# Patient Record
Sex: MALE | Race: WHITE
[De-identification: names, ages, dates, MRNs, and addresses within clinical notes are randomized per-mention and may not be internally consistent; named-entity substitution may affect disease eponyms.]

---

## 2019-10-15 NOTE — RAD
EXAM: 3 views of the right second toe



HISTORY: Right second toe pain for 8 weeks



COMPARISON: None



FINDINGS: There is no evidence of acute fracture or dislocation. No soft tissue swelling is seen. No 
degenerative changes are present. No radiopaque foreign body is seen.



IMPRESSION: No evidence of acute osseous abnormality.



Reported By: Joshua Nicolas 

Electronically Signed:  10/15/2019 9:55 AM

## 2021-07-17 ENCOUNTER — HOSPITAL ENCOUNTER (INPATIENT)
Dept: HOSPITAL 92 - ERS | Age: 46
LOS: 3 days | Discharge: HOME | DRG: 177 | End: 2021-07-20
Attending: INTERNAL MEDICINE | Admitting: INTERNAL MEDICINE
Payer: COMMERCIAL

## 2021-07-17 VITALS — BODY MASS INDEX: 25.9 KG/M2

## 2021-07-17 DIAGNOSIS — I10: ICD-10-CM

## 2021-07-17 DIAGNOSIS — J12.82: ICD-10-CM

## 2021-07-17 DIAGNOSIS — Z98.52: ICD-10-CM

## 2021-07-17 DIAGNOSIS — Z79.899: ICD-10-CM

## 2021-07-17 DIAGNOSIS — R19.7: ICD-10-CM

## 2021-07-17 DIAGNOSIS — E87.6: ICD-10-CM

## 2021-07-17 DIAGNOSIS — E86.0: ICD-10-CM

## 2021-07-17 DIAGNOSIS — U07.1: Primary | ICD-10-CM

## 2021-07-17 PROCEDURE — 96374 THER/PROPH/DIAG INJ IV PUSH: CPT

## 2021-07-17 PROCEDURE — 82550 ASSAY OF CK (CPK): CPT

## 2021-07-17 PROCEDURE — 93005 ELECTROCARDIOGRAM TRACING: CPT

## 2021-07-17 PROCEDURE — 83690 ASSAY OF LIPASE: CPT

## 2021-07-17 PROCEDURE — 83880 ASSAY OF NATRIURETIC PEPTIDE: CPT

## 2021-07-17 PROCEDURE — 8E0ZXY6 ISOLATION: ICD-10-PCS | Performed by: EMERGENCY MEDICINE

## 2021-07-17 PROCEDURE — 87449 NOS EACH ORGANISM AG IA: CPT

## 2021-07-17 PROCEDURE — 85025 COMPLETE CBC W/AUTO DIFF WBC: CPT

## 2021-07-17 PROCEDURE — 84132 ASSAY OF SERUM POTASSIUM: CPT

## 2021-07-17 PROCEDURE — 71275 CT ANGIOGRAPHY CHEST: CPT

## 2021-07-17 PROCEDURE — 84484 ASSAY OF TROPONIN QUANT: CPT

## 2021-07-17 PROCEDURE — 86140 C-REACTIVE PROTEIN: CPT

## 2021-07-17 PROCEDURE — 80053 COMPREHEN METABOLIC PANEL: CPT

## 2021-07-17 PROCEDURE — 85379 FIBRIN DEGRADATION QUANT: CPT

## 2021-07-17 PROCEDURE — 87324 CLOSTRIDIUM AG IA: CPT

## 2021-07-17 PROCEDURE — 36415 COLL VENOUS BLD VENIPUNCTURE: CPT

## 2021-07-17 PROCEDURE — 87328 CRYPTOSPORIDIUM AG IA: CPT

## 2021-07-17 PROCEDURE — 71045 X-RAY EXAM CHEST 1 VIEW: CPT

## 2021-07-17 PROCEDURE — 87329 GIARDIA AG IA: CPT

## 2021-07-20 VITALS — SYSTOLIC BLOOD PRESSURE: 139 MMHG | DIASTOLIC BLOOD PRESSURE: 84 MMHG | TEMPERATURE: 97.9 F

## 2022-02-24 ENCOUNTER — HOSPITAL ENCOUNTER (INPATIENT)
Dept: HOSPITAL 92 - ERS | Age: 47
LOS: 2 days | Discharge: HOME | DRG: 247 | End: 2022-02-26
Attending: INTERNAL MEDICINE | Admitting: INTERNAL MEDICINE
Payer: COMMERCIAL

## 2022-02-24 VITALS — BODY MASS INDEX: 28.4 KG/M2

## 2022-02-24 DIAGNOSIS — E78.5: ICD-10-CM

## 2022-02-24 DIAGNOSIS — Z20.822: ICD-10-CM

## 2022-02-24 DIAGNOSIS — Z98.52: ICD-10-CM

## 2022-02-24 DIAGNOSIS — I21.4: Primary | ICD-10-CM

## 2022-02-24 DIAGNOSIS — I77.1: ICD-10-CM

## 2022-02-24 DIAGNOSIS — Z79.899: ICD-10-CM

## 2022-02-24 DIAGNOSIS — I10: ICD-10-CM

## 2022-02-24 DIAGNOSIS — I25.10: ICD-10-CM

## 2022-02-24 DIAGNOSIS — R00.1: ICD-10-CM

## 2022-02-24 DIAGNOSIS — Z98.890: ICD-10-CM

## 2022-02-24 LAB
CK MB SERPL-MCNC: 22.2 NG/ML (ref 0–6.6)
TROPONIN I SERPL DL<=0.01 NG/ML-MCNC: 3.7 NG/ML (ref ?–0.03)

## 2022-02-24 PROCEDURE — 93458 L HRT ARTERY/VENTRICLE ANGIO: CPT

## 2022-02-24 PROCEDURE — 85025 COMPLETE CBC W/AUTO DIFF WBC: CPT

## 2022-02-24 PROCEDURE — 85347 COAGULATION TIME ACTIVATED: CPT

## 2022-02-24 PROCEDURE — 4A023N7 MEASUREMENT OF CARDIAC SAMPLING AND PRESSURE, LEFT HEART, PERCUTANEOUS APPROACH: ICD-10-PCS | Performed by: INTERNAL MEDICINE

## 2022-02-24 PROCEDURE — B2111ZZ FLUOROSCOPY OF MULTIPLE CORONARY ARTERIES USING LOW OSMOLAR CONTRAST: ICD-10-PCS | Performed by: INTERNAL MEDICINE

## 2022-02-24 PROCEDURE — 93798 PHYS/QHP OP CAR RHAB W/ECG: CPT

## 2022-02-24 PROCEDURE — 93010 ELECTROCARDIOGRAM REPORT: CPT

## 2022-02-24 PROCEDURE — 99152 MOD SED SAME PHYS/QHP 5/>YRS: CPT

## 2022-02-24 PROCEDURE — C9600 PERC DRUG-EL COR STENT SING: HCPCS

## 2022-02-24 PROCEDURE — 36415 COLL VENOUS BLD VENIPUNCTURE: CPT

## 2022-02-24 PROCEDURE — C1769 GUIDE WIRE: HCPCS

## 2022-02-24 PROCEDURE — 93005 ELECTROCARDIOGRAM TRACING: CPT

## 2022-02-24 PROCEDURE — 92928 PRQ TCAT PLMT NTRAC ST 1 LES: CPT

## 2022-02-24 PROCEDURE — 82553 CREATINE MB FRACTION: CPT

## 2022-02-24 PROCEDURE — 94760 N-INVAS EAR/PLS OXIMETRY 1: CPT

## 2022-02-24 PROCEDURE — 80061 LIPID PANEL: CPT

## 2022-02-24 PROCEDURE — 80053 COMPREHEN METABOLIC PANEL: CPT

## 2022-02-24 PROCEDURE — 027034Z DILATION OF CORONARY ARTERY, ONE ARTERY WITH DRUG-ELUTING INTRALUMINAL DEVICE, PERCUTANEOUS APPROACH: ICD-10-PCS | Performed by: INTERNAL MEDICINE

## 2022-02-24 PROCEDURE — 93306 TTE W/DOPPLER COMPLETE: CPT

## 2022-02-24 PROCEDURE — 84484 ASSAY OF TROPONIN QUANT: CPT

## 2022-02-24 PROCEDURE — 93926 LOWER EXTREMITY STUDY: CPT

## 2022-02-24 PROCEDURE — C1874 STENT, COATED/COV W/DEL SYS: HCPCS

## 2022-02-24 RX ADMIN — TICAGRELOR SCH MG: 90 TABLET ORAL at 20:00

## 2022-02-25 LAB
ALBUMIN SERPL BCG-MCNC: 3.5 G/DL (ref 3.5–5)
ALP SERPL-CCNC: 69 U/L (ref 40–110)
ALT SERPL W P-5'-P-CCNC: 42 U/L (ref 8–55)
ANION GAP SERPL CALC-SCNC: 10 MMOL/L (ref 10–20)
AST SERPL-CCNC: 99 U/L (ref 5–34)
BASOPHILS # BLD AUTO: 0 THOU/UL (ref 0–0.2)
BASOPHILS NFR BLD AUTO: 0.5 % (ref 0–1)
BILIRUB SERPL-MCNC: 0.7 MG/DL (ref 0.2–1.2)
BUN SERPL-MCNC: 22 MG/DL (ref 8.9–20.6)
CALCIUM SERPL-MCNC: 8.9 MG/DL (ref 7.8–10.44)
CHD RISK SERPL-RTO: 4.2 (ref ?–4.5)
CHLORIDE SERPL-SCNC: 107 MMOL/L (ref 98–107)
CHOLEST SERPL-MCNC: 142 MG/DL
CO2 SERPL-SCNC: 27 MMOL/L (ref 22–29)
CREAT CL PREDICTED SERPL C-G-VRATE: 116 ML/MIN (ref 70–130)
EOSINOPHIL # BLD AUTO: 0.1 THOU/UL (ref 0–0.7)
EOSINOPHIL NFR BLD AUTO: 1.2 % (ref 0–10)
GLOBULIN SER CALC-MCNC: 2.3 G/DL (ref 2.4–3.5)
GLUCOSE SERPL-MCNC: 101 MG/DL (ref 70–105)
HDLC SERPL-MCNC: 34 MG/DL
HGB BLD-MCNC: 12.9 G/DL (ref 14–18)
LDLC SERPL CALC-MCNC: 67 MG/DL
LYMPHOCYTES # BLD: 1.8 THOU/UL (ref 1.2–3.4)
LYMPHOCYTES NFR BLD AUTO: 23.3 % (ref 21–51)
MCH RBC QN AUTO: 30.9 PG (ref 27–31)
MCV RBC AUTO: 93.4 FL (ref 78–98)
MONOCYTES # BLD AUTO: 0.9 THOU/UL (ref 0.11–0.59)
MONOCYTES NFR BLD AUTO: 11.1 % (ref 0–10)
NEUTROPHILS # BLD AUTO: 4.9 THOU/UL (ref 1.4–6.5)
NEUTROPHILS NFR BLD AUTO: 63.9 % (ref 42–75)
PLATELET # BLD AUTO: 233 THOU/UL (ref 130–400)
POTASSIUM SERPL-SCNC: 4.1 MMOL/L (ref 3.5–5.1)
RBC # BLD AUTO: 4.16 MILL/UL (ref 4.7–6.1)
SODIUM SERPL-SCNC: 140 MMOL/L (ref 136–145)
TRIGL SERPL-MCNC: 204 MG/DL (ref ?–150)
TROPONIN I SERPL DL<=0.01 NG/ML-MCNC: 9.04 NG/ML (ref ?–0.03)
WBC # BLD AUTO: 7.7 THOU/UL (ref 4.8–10.8)

## 2022-02-25 RX ADMIN — TICAGRELOR SCH MG: 90 TABLET ORAL at 09:52

## 2022-02-25 RX ADMIN — ASPIRIN SCH MG: 81 TABLET ORAL at 09:51

## 2022-02-25 RX ADMIN — TICAGRELOR SCH MG: 90 TABLET ORAL at 22:57

## 2022-02-26 VITALS — DIASTOLIC BLOOD PRESSURE: 83 MMHG | SYSTOLIC BLOOD PRESSURE: 127 MMHG | TEMPERATURE: 98.5 F

## 2022-02-26 RX ADMIN — TICAGRELOR SCH MG: 90 TABLET ORAL at 09:26

## 2022-02-26 RX ADMIN — ASPIRIN SCH MG: 81 TABLET ORAL at 09:27

## 2022-11-15 ENCOUNTER — HOSPITAL ENCOUNTER (OUTPATIENT)
Dept: HOSPITAL 92 - LABBT | Age: 47
Discharge: HOME | End: 2022-11-15
Attending: INTERNAL MEDICINE
Payer: COMMERCIAL

## 2022-11-15 DIAGNOSIS — Z01.812: Primary | ICD-10-CM

## 2022-11-15 DIAGNOSIS — I25.10: ICD-10-CM

## 2022-11-15 LAB
ALBUMIN SERPL BCG-MCNC: 4.5 G/DL (ref 3.5–5)
ALP SERPL-CCNC: 64 U/L (ref 40–110)
ALT SERPL W P-5'-P-CCNC: 26 U/L (ref 8–55)
ANION GAP SERPL CALC-SCNC: 13 MMOL/L (ref 10–20)
AST SERPL-CCNC: 24 U/L (ref 5–34)
BASOPHILS # BLD AUTO: 0.1 10X3/UL (ref 0–0.2)
BASOPHILS NFR BLD AUTO: 0.7 % (ref 0–2)
BILIRUB SERPL-MCNC: 1.3 MG/DL (ref 0.2–1.2)
BUN SERPL-MCNC: 25 MG/DL (ref 8.9–20.6)
CALCIUM SERPL-MCNC: 9.9 MG/DL (ref 7.8–10.44)
CHLORIDE SERPL-SCNC: 105 MMOL/L (ref 98–107)
CO2 SERPL-SCNC: 28 MMOL/L (ref 22–29)
CREAT CL PREDICTED SERPL C-G-VRATE: 0 ML/MIN (ref 70–130)
EOSINOPHIL # BLD AUTO: 0.2 10X3/UL (ref 0–0.5)
EOSINOPHIL NFR BLD AUTO: 1.8 % (ref 0–6)
GLOBULIN SER CALC-MCNC: 2.8 G/DL (ref 2.4–3.5)
GLUCOSE SERPL-MCNC: 77 MG/DL (ref 70–105)
HGB BLD-MCNC: 16.5 G/DL (ref 13.5–17.5)
LYMPHOCYTES NFR BLD AUTO: 25.4 % (ref 18–47)
MCH RBC QN AUTO: 31.4 PG (ref 27–33)
MCV RBC AUTO: 89.7 FL (ref 81.2–95.1)
MONOCYTES # BLD AUTO: 0.8 10X3/UL (ref 0–1.1)
MONOCYTES NFR BLD AUTO: 9.7 % (ref 0–10)
NEUTROPHILS # BLD AUTO: 5.3 10X3/UL (ref 1.5–8.4)
NEUTROPHILS NFR BLD AUTO: 62 % (ref 40–75)
PLATELET # BLD AUTO: 307 10X3/UL (ref 150–450)
POTASSIUM SERPL-SCNC: 3.9 MMOL/L (ref 3.5–5.1)
RBC # BLD AUTO: 5.26 10X6/UL (ref 4.32–5.72)
SODIUM SERPL-SCNC: 142 MMOL/L (ref 136–145)
WBC # BLD AUTO: 8.5 10X3/UL (ref 3.5–10.5)

## 2022-11-15 PROCEDURE — 80053 COMPREHEN METABOLIC PANEL: CPT

## 2022-11-15 PROCEDURE — 85025 COMPLETE CBC W/AUTO DIFF WBC: CPT
